# Patient Record
Sex: MALE | Race: OTHER | Employment: FULL TIME | URBAN - METROPOLITAN AREA
[De-identification: names, ages, dates, MRNs, and addresses within clinical notes are randomized per-mention and may not be internally consistent; named-entity substitution may affect disease eponyms.]

---

## 2023-09-05 ENCOUNTER — HOSPITAL ENCOUNTER (EMERGENCY)
Age: 33
Discharge: HOME | End: 2023-09-05
Payer: COMMERCIAL

## 2023-09-05 VITALS
TEMPERATURE: 98 F | RESPIRATION RATE: 17 BRPM | DIASTOLIC BLOOD PRESSURE: 62 MMHG | SYSTOLIC BLOOD PRESSURE: 135 MMHG | OXYGEN SATURATION: 99 % | HEART RATE: 86 BPM

## 2023-09-05 DIAGNOSIS — M43.6 TORTICOLLIS: ICD-10-CM

## 2023-09-05 DIAGNOSIS — M62.838 SPASM OF MUSCLE: Primary | ICD-10-CM

## 2023-09-05 PROCEDURE — 99283 EMERGENCY DEPT VISIT LOW MDM: CPT

## 2023-09-05 PROCEDURE — 6370000000 HC RX 637 (ALT 250 FOR IP): Performed by: NURSE PRACTITIONER

## 2023-09-05 RX ORDER — IBUPROFEN 600 MG/1
600 TABLET ORAL 3 TIMES DAILY PRN
Qty: 30 TABLET | Refills: 0 | Status: SHIPPED | OUTPATIENT
Start: 2023-09-05

## 2023-09-05 RX ORDER — IBUPROFEN 600 MG/1
600 TABLET ORAL ONCE
Status: COMPLETED | OUTPATIENT
Start: 2023-09-05 | End: 2023-09-05

## 2023-09-05 RX ADMIN — IBUPROFEN 600 MG: 600 TABLET ORAL at 17:39

## 2023-09-05 ASSESSMENT — PAIN SCALES - GENERAL: PAINLEVEL_OUTOF10: 6

## 2023-09-05 NOTE — ED PROVIDER NOTES
[address] [facility] ENCOUNTER      Pt Name: Hunter Leblanc  MRN: [id number]  [address] [facility] 1990  Date of evaluation: 9/5/2023  Provider: NABEEL Multani CNP  PCP: No primary care provider on file. Note Started: 3:09 PM EDT 9/5/23    CHIEF COMPLAINT       Chief Complaint   Patient presents with    Back Pain     X 1 hour PTA       HISTORY OF PRESENT ILLNESS: 1 or more Elements   Hunter Leblanc is a 33 y.o. male who presents to the emergency department with back pain located neck. He noticed neck pain after taking a shower today so came to the emergency room. The onset of symptoms was 1 hour prior to arrival to the emergency department   The pain is described as cramping. It is rated 4/10. Modifying factors include nothing  Alleviating factors include nothing. He did not take any medication prior to coming to the emergency department. Patient denies any back pain red flags. I have reviewed the nursing triage documentation and agree unless otherwise noted. REVIEW OF SYSTEMS :    Review of Systems    Constitutional:  Denies fever or chills  Eyes:  Denies change in visual acuity  HENT:  Denies nasal congestion or sore throat  Respiratory:  Denies cough or shortness of breath  Cardiovascular:  Denies chest pain or edema  GI:  Denies abdominal pain, nausea, vomiting, bloody stools or diarrhea  :  Denies dysuria  Musculoskeletal:  Complains of back pain  Integument:  Denies rash  Neurologic:  Denies headache, focal weakness or sensory changes    I have reviewed the nursing triage documentation and agree unless otherwise noted   Positives and Pertinent negatives as per HPI. SURGICAL HISTORY   No past surgical history on file. CURRENTMEDICATIONS       Previous Medications    No medications on file       ALLERGIES     Patient has no known allergies. FAMILYHISTORY     No family history on file.      SOCIAL